# Patient Record
Sex: MALE | Race: OTHER | HISPANIC OR LATINO | URBAN - METROPOLITAN AREA
[De-identification: names, ages, dates, MRNs, and addresses within clinical notes are randomized per-mention and may not be internally consistent; named-entity substitution may affect disease eponyms.]

---

## 2017-04-11 ENCOUNTER — EMERGENCY (EMERGENCY)
Facility: HOSPITAL | Age: 32
LOS: 1 days | Discharge: HOME | End: 2017-04-11
Attending: EMERGENCY MEDICINE | Admitting: EMERGENCY MEDICINE
Payer: COMMERCIAL

## 2017-04-11 VITALS
SYSTOLIC BLOOD PRESSURE: 147 MMHG | HEART RATE: 90 BPM | RESPIRATION RATE: 18 BRPM | OXYGEN SATURATION: 97 % | TEMPERATURE: 98 F | DIASTOLIC BLOOD PRESSURE: 88 MMHG

## 2017-04-11 VITALS
DIASTOLIC BLOOD PRESSURE: 77 MMHG | OXYGEN SATURATION: 97 % | HEART RATE: 72 BPM | SYSTOLIC BLOOD PRESSURE: 127 MMHG | TEMPERATURE: 98 F | RESPIRATION RATE: 18 BRPM

## 2017-04-11 DIAGNOSIS — F17.200 NICOTINE DEPENDENCE, UNSPECIFIED, UNCOMPLICATED: ICD-10-CM

## 2017-04-11 DIAGNOSIS — H69.92 UNSPECIFIED EUSTACHIAN TUBE DISORDER, LEFT EAR: ICD-10-CM

## 2017-04-11 DIAGNOSIS — G44.209 TENSION-TYPE HEADACHE, UNSPECIFIED, NOT INTRACTABLE: ICD-10-CM

## 2017-04-11 DIAGNOSIS — Z79.899 OTHER LONG TERM (CURRENT) DRUG THERAPY: ICD-10-CM

## 2017-04-11 DIAGNOSIS — Z79.52 LONG TERM (CURRENT) USE OF SYSTEMIC STEROIDS: ICD-10-CM

## 2017-04-11 DIAGNOSIS — R51 HEADACHE: ICD-10-CM

## 2017-04-11 PROCEDURE — 99283 EMERGENCY DEPT VISIT LOW MDM: CPT | Mod: 25

## 2017-04-11 PROCEDURE — 94640 AIRWAY INHALATION TREATMENT: CPT

## 2017-04-11 PROCEDURE — 99284 EMERGENCY DEPT VISIT MOD MDM: CPT | Mod: 25

## 2017-04-11 RX ORDER — IPRATROPIUM/ALBUTEROL SULFATE 18-103MCG
3 AEROSOL WITH ADAPTER (GRAM) INHALATION ONCE
Qty: 0 | Refills: 0 | Status: COMPLETED | OUTPATIENT
Start: 2017-04-11 | End: 2017-04-11

## 2017-04-11 RX ORDER — ALBUTEROL 90 UG/1
2.5 AEROSOL, METERED ORAL ONCE
Qty: 0 | Refills: 0 | Status: DISCONTINUED | OUTPATIENT
Start: 2017-04-11 | End: 2017-04-11

## 2017-04-11 RX ORDER — CYCLOBENZAPRINE HYDROCHLORIDE 10 MG/1
1 TABLET, FILM COATED ORAL
Qty: 5 | Refills: 0 | OUTPATIENT
Start: 2017-04-11

## 2017-04-11 RX ORDER — AMOXICILLIN 250 MG/5ML
1 SUSPENSION, RECONSTITUTED, ORAL (ML) ORAL
Qty: 10 | Refills: 0 | OUTPATIENT
Start: 2017-04-11

## 2017-04-11 RX ORDER — CYCLOBENZAPRINE HYDROCHLORIDE 10 MG/1
5 TABLET, FILM COATED ORAL ONCE
Qty: 0 | Refills: 0 | Status: COMPLETED | OUTPATIENT
Start: 2017-04-11 | End: 2017-04-11

## 2017-04-11 RX ADMIN — Medication 60 MILLIGRAM(S): at 02:49

## 2017-04-11 RX ADMIN — CYCLOBENZAPRINE HYDROCHLORIDE 5 MILLIGRAM(S): 10 TABLET, FILM COATED ORAL at 03:25

## 2017-04-11 RX ADMIN — Medication 3 MILLILITER(S): at 02:49

## 2017-04-11 NOTE — ED PROVIDER NOTE - PHYSICAL EXAMINATION
PHYSICAL EXAM:    Constitutional: Awake, alert and in no acute distress. Appears worried, nontoxic   Eyes: PERRLA, EOMI. Non-icteric.   ENMT: MMM, no thrush, no pharyngeal erythema or drainage. Right TM normal, left TM with imbedded tympanostomy tube, no signs of erythema or drainage, +cerumen  Neck: Supple, No JVD  Respiratory:  diffuse wheezes, good air movement  Cardiovascular: RRR no MRG  Gastrointestinal: Soft, ND NT. Normoactive BSx4  Extremities: No pitting edema  Neurological:  No focal motor or sensory deficits  Skin: Warm, dry and well perfused

## 2017-04-11 NOTE — ED PROVIDER NOTE - OBJECTIVE STATEMENT
30 yo M with pmhx of asthma and eczema presents with 5 day hx of headache and earache. Patient states he felt a pop in his ear with headache which is bilateral, starting in his neck and back of his head and coming around to the front. He reports "not feeling himself", tired. Also has been using his inhaler more frequently. No SOB. Reports a lot of stress in his life due to lost job and death of family member. No photophobia, nausea, visual changes, fevers, chills, recent travel. No hx of headaches. He states he had tubes in his ears but his left one did not fall out and was never taken out.

## 2017-04-11 NOTE — ED PROVIDER NOTE - ATTENDING CONTRIBUTION TO CARE
32 yo male with hx of asthma- no intubations--  with right OM and mild asthma exacerbation- x 1 day  will place on augmentin and prednisone with albuterol HFA  clinically improved in ED  stable for dc

## 2017-04-11 NOTE — ED ADULT NURSE NOTE - OBJECTIVE STATEMENT
Patient arrived to ED via walk-in stating, "My head is killing me.  It feels like my heart is beating in my head and my left ear feels full."  Patient is A&Ox3, in NAD, complaining of 10/10 generalized headache, left ear pain and slight wheezing.  Patient denies chest pain, SOB, photophobia, vision changes, fevers, chills, cough, nausea, vomiting or diarrhea.  PMH of Asthma.  Will continue with plan of care.

## 2017-04-11 NOTE — ED PROVIDER NOTE - CARE PLAN
Principal Discharge DX:	Tension headache  Secondary Diagnosis:	Eustachian tube dysfunction, left Principal Discharge DX:	Tension headache  Secondary Diagnosis:	Eustachian tube dysfunction, left  Secondary Diagnosis:	Otitis media

## 2017-04-11 NOTE — ED ADULT TRIAGE NOTE - CHIEF COMPLAINT QUOTE
pt has a history of Asthma pt c/o left ear pain ,pressure for 3 days , c/o severe headache , and chest tightness ( feel like asthma is acting up ) , no visual changes , no weakness , no dizziness

## 2017-04-11 NOTE — ED PROVIDER NOTE - MEDICAL DECISION MAKING DETAILS
Patient with likely tension headache by history, no signs of migraine. Likely exacerbated by stress. Also with retained tympanostomy tube, possibly source of eustachian tube dysfunction. Will treat for mild asthma exacerbation with PO steroid taper. Stable to DC home with outpatient follow up with PCP and ENT
